# Patient Record
Sex: FEMALE | Race: WHITE | Employment: FULL TIME | URBAN - METROPOLITAN AREA
[De-identification: names, ages, dates, MRNs, and addresses within clinical notes are randomized per-mention and may not be internally consistent; named-entity substitution may affect disease eponyms.]

---

## 2017-12-18 ENCOUNTER — OFFICE VISIT (OUTPATIENT)
Dept: OBGYN CLINIC | Age: 47
End: 2017-12-18
Payer: COMMERCIAL

## 2017-12-18 ENCOUNTER — HOSPITAL ENCOUNTER (OUTPATIENT)
Age: 47
Setting detail: SPECIMEN
Discharge: HOME | End: 2017-12-18
Payer: COMMERCIAL

## 2017-12-18 VITALS
BODY MASS INDEX: 26.15 KG/M2 | HEIGHT: 64 IN | DIASTOLIC BLOOD PRESSURE: 84 MMHG | SYSTOLIC BLOOD PRESSURE: 122 MMHG | WEIGHT: 153.2 LBS

## 2017-12-18 DIAGNOSIS — Z01.419 ENCOUNTER FOR GYNECOLOGICAL EXAMINATION WITHOUT ABNORMAL FINDING: Primary | ICD-10-CM

## 2017-12-18 DIAGNOSIS — Z12.31 ENCOUNTER FOR SCREENING MAMMOGRAM FOR BREAST CANCER: ICD-10-CM

## 2017-12-18 PROCEDURE — 99396 PREV VISIT EST AGE 40-64: CPT | Performed by: NURSE PRACTITIONER

## 2017-12-18 ASSESSMENT — ENCOUNTER SYMPTOMS
DIARRHEA: 0
BACK PAIN: 0
ABDOMINAL PAIN: 0
SHORTNESS OF BREATH: 0
ABDOMINAL DISTENTION: 0
CONSTIPATION: 0
COUGH: 0

## 2017-12-18 NOTE — PROGRESS NOTES
HPI:     Virgen Henry is a 52 y.o. female who presents today for:   Chief Complaint   Patient presents with    Annual Exam     last pap 12/14/16-WNL, last mammogram 12/29/16-WNL       HPI  Here for annual exam.  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 Has 4 children- 19- 13. Eats pretty healthy. Active, but not currently regularly exercising. Sister was dx w/BrCa at age 54. GYNECOLOGIC HISTORY:  Menstrual History: Patient's last menstrual period was 12/04/2017. Sexually Transmitted Infections: No  History of Ectopic Pregnancy:No  Menarche: 12  Cycles 28-30  Duration of cycles: 7  Dysmenorrhea: none  Sexually active: yes  Dyspareunia: none    No current outpatient prescriptions on file. No current facility-administered medications for this visit. No Known Allergies    Past Medical History:   Diagnosis Date    HSV (herpes simplex virus) anogenital infection      Denies family history of breast, ovarian, uterus, colon CA     Past Surgical History:   Procedure Laterality Date    WISDOM TOOTH EXTRACTION       Family History   Problem Relation Age of Onset    Hypertension Father 80    Cancer Father      skin    Cancer Mother      skin    Breast Cancer Sister      Social History   Substance Use Topics    Smoking status: Never Smoker    Smokeless tobacco: Never Used    Alcohol use Yes        Subjective:      Review of Systems   Constitutional: Negative for appetite change and fatigue. HENT: Negative for congestion and hearing loss. Eyes: Negative for visual disturbance. Respiratory: Negative for cough and shortness of breath. Cardiovascular: Negative for chest pain and palpitations. Gastrointestinal: Negative for abdominal distention, abdominal pain, constipation and diarrhea. Genitourinary: Negative for flank pain, frequency, menstrual problem, pelvic pain and vaginal discharge. Musculoskeletal: Negative for back pain. Neurological: Negative for syncope and headaches. Psychiatric/Behavioral: Negative for behavioral problems. Objective:     /84 (Site: Right Arm, Position: Sitting, Cuff Size: Medium Adult)   Ht 5' 4\" (1.626 m)   Wt 153 lb 3.2 oz (69.5 kg)   LMP 12/04/2017   Breastfeeding? No   BMI 26.30 kg/m²   Physical Exam   Constitutional: She is oriented to person, place, and time. She appears well-developed and well-nourished. Eyes: Pupils are equal, round, and reactive to light. Neck: No tracheal deviation present. No thyromegaly present. Cardiovascular: Normal rate, regular rhythm and normal heart sounds. Pulmonary/Chest: Effort normal and breath sounds normal. No respiratory distress. Right breast exhibits no inverted nipple. Left breast exhibits no inverted nipple, no mass, no nipple discharge, no skin change and no tenderness. Breasts are symmetrical.   Abdominal: Soft. Bowel sounds are normal. She exhibits no distension and no mass. There is no tenderness. There is no CVA tenderness. Hernia confirmed negative in the right inguinal area and confirmed negative in the left inguinal area. Genitourinary: No breast swelling, tenderness, discharge or bleeding. There is no rash or lesion on the right labia. There is no rash or lesion on the left labia. Uterus is not deviated, not enlarged and not fixed. Cervix exhibits no motion tenderness, no discharge and no friability. Right adnexum displays no mass, no tenderness and no fullness. Left adnexum displays no mass, no tenderness and no fullness. No tenderness in the vagina. No vaginal discharge found. Musculoskeletal: She exhibits no edema or tenderness. Lymphadenopathy:     She has no cervical adenopathy. Right: No inguinal adenopathy present. Left: No inguinal adenopathy present. Neurological: She is alert and oriented to person, place, and time. Skin: Skin is warm and dry. Psychiatric: She has a normal mood and affect.  Her behavior is normal. Judgment and thought content

## 2017-12-28 LAB — CYTOLOGY REPORT: NORMAL

## 2018-02-02 ENCOUNTER — HOSPITAL ENCOUNTER (OUTPATIENT)
Dept: MAMMOGRAPHY | Age: 48
Discharge: HOME | End: 2018-02-04
Payer: COMMERCIAL

## 2018-02-02 DIAGNOSIS — Z12.31 ENCOUNTER FOR SCREENING MAMMOGRAM FOR BREAST CANCER: ICD-10-CM

## 2018-02-02 PROCEDURE — 77067 SCR MAMMO BI INCL CAD: CPT

## 2018-05-11 ENCOUNTER — EMPLOYEE WELLNESS (OUTPATIENT)
Dept: OTHER | Age: 48
End: 2018-05-11

## 2018-05-11 LAB
CHOLESTEROL/HDL RATIO: 2.9
CHOLESTEROL: 175 MG/DL
GLUCOSE BLD-MCNC: 87 MG/DL (ref 70–99)
HDLC SERPL-MCNC: 61 MG/DL
LDL CHOLESTEROL: 105 MG/DL (ref 0–130)
PATIENT FASTING?: NORMAL
TRIGL SERPL-MCNC: 45 MG/DL
VLDLC SERPL CALC-MCNC: NORMAL MG/DL (ref 1–30)

## 2018-05-21 VITALS — WEIGHT: 149 LBS | BODY MASS INDEX: 25.58 KG/M2

## 2019-02-07 ENCOUNTER — HOSPITAL ENCOUNTER (OUTPATIENT)
Age: 49
Setting detail: SPECIMEN
Discharge: HOME | End: 2019-02-07
Payer: COMMERCIAL

## 2019-02-07 ENCOUNTER — OFFICE VISIT (OUTPATIENT)
Dept: OBGYN CLINIC | Age: 49
End: 2019-02-07
Payer: COMMERCIAL

## 2019-02-07 VITALS
BODY MASS INDEX: 24.52 KG/M2 | DIASTOLIC BLOOD PRESSURE: 84 MMHG | HEIGHT: 64 IN | SYSTOLIC BLOOD PRESSURE: 122 MMHG | WEIGHT: 143.6 LBS

## 2019-02-07 DIAGNOSIS — Z01.419 ENCOUNTER FOR GYNECOLOGICAL EXAMINATION: Primary | ICD-10-CM

## 2019-02-07 DIAGNOSIS — Z12.31 ENCOUNTER FOR SCREENING MAMMOGRAM FOR BREAST CANCER: ICD-10-CM

## 2019-02-07 PROCEDURE — 99396 PREV VISIT EST AGE 40-64: CPT | Performed by: NURSE PRACTITIONER

## 2019-02-07 ASSESSMENT — ENCOUNTER SYMPTOMS
COUGH: 0
BACK PAIN: 0
SHORTNESS OF BREATH: 0
ABDOMINAL PAIN: 0
DIARRHEA: 0
ABDOMINAL DISTENTION: 0
CONSTIPATION: 0

## 2019-02-11 ENCOUNTER — OFFICE VISIT (OUTPATIENT)
Dept: FAMILY MEDICINE CLINIC | Age: 49
End: 2019-02-11
Payer: COMMERCIAL

## 2019-02-11 VITALS
TEMPERATURE: 98.4 F | OXYGEN SATURATION: 98 % | WEIGHT: 144 LBS | HEIGHT: 64 IN | SYSTOLIC BLOOD PRESSURE: 112 MMHG | BODY MASS INDEX: 24.59 KG/M2 | HEART RATE: 83 BPM | DIASTOLIC BLOOD PRESSURE: 68 MMHG

## 2019-02-11 DIAGNOSIS — Z00.00 ROUTINE GENERAL MEDICAL EXAMINATION AT A HEALTH CARE FACILITY: Primary | ICD-10-CM

## 2019-02-11 DIAGNOSIS — Z76.89 ESTABLISHING CARE WITH NEW DOCTOR, ENCOUNTER FOR: ICD-10-CM

## 2019-02-11 DIAGNOSIS — Z23 NEED FOR DIPHTHERIA-TETANUS-PERTUSSIS (TDAP) VACCINE: ICD-10-CM

## 2019-02-11 PROCEDURE — 90471 IMMUNIZATION ADMIN: CPT | Performed by: NURSE PRACTITIONER

## 2019-02-11 PROCEDURE — 99386 PREV VISIT NEW AGE 40-64: CPT | Performed by: NURSE PRACTITIONER

## 2019-02-11 PROCEDURE — 90715 TDAP VACCINE 7 YRS/> IM: CPT | Performed by: NURSE PRACTITIONER

## 2019-02-11 ASSESSMENT — PATIENT HEALTH QUESTIONNAIRE - PHQ9
SUM OF ALL RESPONSES TO PHQ QUESTIONS 1-9: 0
2. FEELING DOWN, DEPRESSED OR HOPELESS: 0
SUM OF ALL RESPONSES TO PHQ9 QUESTIONS 1 & 2: 0
1. LITTLE INTEREST OR PLEASURE IN DOING THINGS: 0
SUM OF ALL RESPONSES TO PHQ QUESTIONS 1-9: 0

## 2019-02-11 ASSESSMENT — ENCOUNTER SYMPTOMS
COLOR CHANGE: 0
SORE THROAT: 0
SINUS PRESSURE: 0
DIARRHEA: 0
NAUSEA: 0
ABDOMINAL PAIN: 0
CHEST TIGHTNESS: 0
COUGH: 0
CONSTIPATION: 0
SHORTNESS OF BREATH: 0

## 2019-02-19 ENCOUNTER — HOSPITAL ENCOUNTER (OUTPATIENT)
Dept: MAMMOGRAPHY | Age: 49
Discharge: HOME | End: 2019-02-21
Payer: COMMERCIAL

## 2019-02-19 DIAGNOSIS — Z12.31 ENCOUNTER FOR SCREENING MAMMOGRAM FOR BREAST CANCER: ICD-10-CM

## 2019-02-19 PROCEDURE — 77063 BREAST TOMOSYNTHESIS BI: CPT

## 2019-02-21 LAB — CYTOLOGY REPORT: NORMAL

## 2020-09-09 ENCOUNTER — HOSPITAL ENCOUNTER (OUTPATIENT)
Dept: MAMMOGRAPHY | Age: 50
Discharge: HOME | End: 2020-09-11
Payer: COMMERCIAL

## 2020-09-09 PROCEDURE — 77063 BREAST TOMOSYNTHESIS BI: CPT

## 2020-09-10 LAB
CHOLESTEROL/HDL RATIO: 3.5
CHOLESTEROL: 216 MG/DL
GLUCOSE BLD-MCNC: 89 MG/DL (ref 70–99)
HDLC SERPL-MCNC: 62 MG/DL
LDL CHOLESTEROL: 143 MG/DL (ref 0–130)
PATIENT FASTING?: YES
TRIGL SERPL-MCNC: 56 MG/DL
VLDLC SERPL CALC-MCNC: ABNORMAL MG/DL (ref 1–30)

## 2020-09-11 ENCOUNTER — EMPLOYEE WELLNESS (OUTPATIENT)
Dept: OTHER | Age: 50
End: 2020-09-11

## 2020-10-19 VITALS — BODY MASS INDEX: 25.58 KG/M2 | WEIGHT: 149 LBS

## 2021-03-31 ENCOUNTER — OFFICE VISIT (OUTPATIENT)
Dept: FAMILY MEDICINE CLINIC | Age: 51
End: 2021-03-31
Payer: COMMERCIAL

## 2021-03-31 ENCOUNTER — HOSPITAL ENCOUNTER (OUTPATIENT)
Age: 51
Setting detail: SPECIMEN
Discharge: HOME | End: 2021-03-31
Payer: COMMERCIAL

## 2021-03-31 VITALS
HEIGHT: 64 IN | SYSTOLIC BLOOD PRESSURE: 112 MMHG | TEMPERATURE: 97.9 F | OXYGEN SATURATION: 94 % | BODY MASS INDEX: 25.99 KG/M2 | HEART RATE: 79 BPM | DIASTOLIC BLOOD PRESSURE: 72 MMHG | WEIGHT: 152.2 LBS

## 2021-03-31 DIAGNOSIS — Z12.31 ENCOUNTER FOR SCREENING MAMMOGRAM FOR MALIGNANT NEOPLASM OF BREAST: ICD-10-CM

## 2021-03-31 DIAGNOSIS — Z12.4 CERVICAL CANCER SCREENING: Primary | ICD-10-CM

## 2021-03-31 DIAGNOSIS — Z12.11 COLON CANCER SCREENING: ICD-10-CM

## 2021-03-31 DIAGNOSIS — Z12.31 ENCOUNTER FOR SCREENING MAMMOGRAM FOR MALIGNANT NEOPLASM OF BREAST: Primary | ICD-10-CM

## 2021-03-31 PROCEDURE — 99396 PREV VISIT EST AGE 40-64: CPT | Performed by: NURSE PRACTITIONER

## 2021-03-31 ASSESSMENT — PATIENT HEALTH QUESTIONNAIRE - PHQ9
2. FEELING DOWN, DEPRESSED OR HOPELESS: 0
SUM OF ALL RESPONSES TO PHQ QUESTIONS 1-9: 0

## 2021-03-31 NOTE — PROGRESS NOTES
Visit Information    Have you changed or started any medications since your last visit including any over-the-counter medicines, vitamins, or herbal medicines? no   Have you stopped taking any of your medications? Is so, why? -  no  Are you having any side effects from any of your medications? - no    Have you seen any other physician or provider since your last visit?  no   Have you had any other diagnostic tests since your last visit?  no   Have you been seen in the emergency room and/or had an admission in a hospital since we last saw you?  no   Have you had your routine dental cleaning in the past 6 months?  no     Do you have an active MyChart account? If no, what is the barrier?   Yes    Patient Care Team:  RUSSEL Redd CNP as PCP - General (Family Nurse Practitioner)  RUSSEL Redd CNP as PCP - [facility] Provider    Medical History Review  Past Medical, Family, and Social History reviewed and  contribute to the patient presenting condition    Health Maintenance   Topic Date Due    Hepatitis C screen  Never done    HIV screen  Never done    COVID-19 Vaccine (1) Never done    Shingles Vaccine (1 of 2) Never done    Colon cancer screen colonoscopy  Never done    Breast cancer screen  09/09/2021    Cervical cancer screen  02/07/2024    Lipid screen  09/10/2025    DTaP/Tdap/Td vaccine (2 - Td) 02/11/2029    Flu vaccine  Completed    Hepatitis A vaccine  Aged Out    Hepatitis B vaccine  Aged Out    Hib vaccine  Aged Out    Meningococcal (ACWY) vaccine  Aged Out    Pneumococcal 0-64 years Vaccine  Aged Out

## 2021-03-31 NOTE — PROGRESS NOTES
Out       Subjective:      normal menses, no abnormal bleeding, pelvic pain or discharge, no breast pain or new or enlarging lumps on self exam, no vaginal bleeding, no discharge or pelvic pain. No new partners. No present concerns. Objective:   /72 (Site: Right Upper Arm, Position: Sitting, Cuff Size: Medium Adult)   Pulse 79   Temp 97.9 °F (36.6 °C) (Temporal)   Ht 5' 4\" (1.626 m)   Wt 152 lb 3.2 oz (69 kg)   LMP 03/14/2021 (Exact Date)   SpO2 94%   BMI 26.13 kg/m²     General Appearance: alert and oriented to person, place and time, well developed and well- nourished, in no acute distress  Skin: warm and dry, no rash or erythema  Head: normocephalic and atraumatic  Neck: supple and non-tender without mass, no thyromegaly or thyroid nodules, no cervical lymphadenopathy  Pulmonary/Chest: clear to auscultation bilaterally- no wheezes, rales or rhonchi, normal air movement, no respiratory distress  Cardiovascular: normal rate, regular rhythm, normal S1 and S2, no murmurs, rubs, clicks, or gallops  Abdomen: soft, non-tender, non-distended, normal bowel sounds, no masses or organomegaly  Pelvic: normal external genitalia, vulva, vagina, cervix, uterus and adnexa. No CMT. No noted discharge or lesions. No masses noted. Breast: appear normal, no suspicious masses, no skin or nipple changes or axillary nodes bilaterally. Psych: pleasant, cooperative          Assessment:    annual pap exam   Diagnosis Orders   1. Cervical cancer screening  PAP Smear   2. Colon cancer screening  Cologuard (For External Results Only)   3. Encounter for screening mammogram for malignant neoplasm of breast  MRI BREAST BILATERAL WO CONTRAST     Wt Readings from Last 3 Encounters:   03/31/21 152 lb 3.2 oz (69 kg)   09/11/20 149 lb (67.6 kg)   02/11/19 144 lb (65.3 kg)     BP Readings from Last 3 Encounters:   03/31/21 112/72   02/11/19 112/68   02/07/19 122/84       Plan:      Return in about 1 year (around 3/31/2022). Orders Placed This Encounter   Procedures    Cologuard (For External Results Only)     This test is performed by an external laboratory and is used for result attachment only. It is required that this order requisition be faxed to: [facility] @ [phone]. See [email or web address] for further information. Standing Status:   Future     Standing Expiration Date:   3/31/2022    MRI BREAST BILATERAL WO CONTRAST     Standing Status:   Future     Standing Expiration Date:   3/31/2022     Order Specific Question:   Reason for exam:     Answer:   eval for changes    PAP Smear     Patient History:    No LMP recorded. OBGYN Status: Having periods  Past Surgical History:  No date: WISDOM TOOTH EXTRACTION      Social History    Tobacco Use      Smoking status: Never Smoker      Smokeless tobacco: Never Used       Standing Status:   Future     Standing Expiration Date:   3/31/2022     Order Specific Question:   Collection Type     Answer: Thin Prep     Order Specific Question:   Prior Abnormal Pap Test     Answer:   No     Order Specific Question:   Screening or Diagnostic     Answer:   Screening     Order Specific Question:   HPV Requested? Answer:   Yes -  If ASCUS Reflex HPV     Order Specific Question:   High Risk Patient     Answer:   N/A     Labs utd  MRI breast ordered, she would like to hold on high risk breast clinic for now  Patient advised to follow heart healthy, low fat  diet and 150 minutes of cardiovascular exercise per week   The nature of sun-induced photo-aging and skin cancers is discussed. Sun avoidance, protective clothing, and the use of 30-SPF sunscreens is advised. Observe closely for skin damage/changes, and call if such occurs. Will call with test results  Send back cologuard kit asap      Patient given educational materials - see patient instructions. Discussed use, benefit, and side effects of prescribed medications. All patient questions answered.   Pt voiced understanding. Reviewed health maintenance. Instructed to continue current medications, diet and exercise. Patient agreed with treatment plan. Follow up as directed below.      Electronically signed by SANA Márquez on 3/31/2021 at 11:33 AM

## 2021-04-06 LAB — CYTOLOGY REPORT: NORMAL

## 2021-04-15 DIAGNOSIS — Z12.11 COLON CANCER SCREENING: ICD-10-CM

## 2021-06-08 ENCOUNTER — HOSPITAL ENCOUNTER (OUTPATIENT)
Dept: MRI IMAGING | Age: 51
Discharge: HOME | End: 2021-06-10
Payer: COMMERCIAL

## 2021-06-08 DIAGNOSIS — Z12.31 ENCOUNTER FOR SCREENING MAMMOGRAM FOR MALIGNANT NEOPLASM OF BREAST: ICD-10-CM

## 2021-06-08 PROCEDURE — 6360000004 HC RX CONTRAST MEDICATION: Performed by: NURSE PRACTITIONER

## 2021-06-08 PROCEDURE — 2580000003 HC RX 258: Performed by: NURSE PRACTITIONER

## 2021-06-08 PROCEDURE — A9579 GAD-BASE MR CONTRAST NOS,1ML: HCPCS | Performed by: NURSE PRACTITIONER

## 2021-06-08 PROCEDURE — 77049 MRI BREAST C-+ W/CAD BI: CPT

## 2021-06-08 RX ORDER — SODIUM CHLORIDE 0.9 % (FLUSH) 0.9 %
10 SYRINGE (ML) INJECTION PRN
Status: DISCONTINUED | OUTPATIENT
Start: 2021-06-08 | End: 2021-06-11 | Stop reason: HOSPADM

## 2021-06-08 RX ORDER — 0.9 % SODIUM CHLORIDE 0.9 %
50 INTRAVENOUS SOLUTION INTRAVENOUS ONCE
Status: COMPLETED | OUTPATIENT
Start: 2021-06-08 | End: 2021-06-08

## 2021-06-08 RX ADMIN — SODIUM CHLORIDE, PRESERVATIVE FREE 10 ML: 5 INJECTION INTRAVENOUS at 11:18

## 2021-06-08 RX ADMIN — GADOTERIDOL 14 ML: 279.3 INJECTION, SOLUTION INTRAVENOUS at 11:16

## 2021-06-08 RX ADMIN — SODIUM CHLORIDE 50 ML: 9 INJECTION, SOLUTION INTRAVENOUS at 11:17

## 2021-09-08 LAB
CHOLESTEROL/HDL RATIO: 3.7
CHOLESTEROL: 201 MG/DL
GLUCOSE BLD-MCNC: 89 MG/DL (ref 70–99)
HDLC SERPL-MCNC: 55 MG/DL
LDL CHOLESTEROL: 135 MG/DL (ref 0–130)
PATIENT FASTING?: YES
TRIGL SERPL-MCNC: 55 MG/DL
VLDLC SERPL CALC-MCNC: ABNORMAL MG/DL (ref 1–30)

## 2021-10-26 ENCOUNTER — HOSPITAL ENCOUNTER (OUTPATIENT)
Dept: MAMMOGRAPHY | Age: 51
Discharge: HOME | End: 2021-10-28
Payer: COMMERCIAL

## 2021-10-26 DIAGNOSIS — Z12.31 VISIT FOR SCREENING MAMMOGRAM: ICD-10-CM

## 2021-10-26 PROCEDURE — 77063 BREAST TOMOSYNTHESIS BI: CPT

## 2021-10-27 DIAGNOSIS — R92.8 ABNORMAL MAMMOGRAM OF RIGHT BREAST: Primary | ICD-10-CM

## 2021-11-15 ENCOUNTER — HOSPITAL ENCOUNTER (OUTPATIENT)
Dept: ULTRASOUND IMAGING | Age: 51
Discharge: HOME | End: 2021-11-17
Payer: COMMERCIAL

## 2021-11-15 ENCOUNTER — HOSPITAL ENCOUNTER (OUTPATIENT)
Dept: MAMMOGRAPHY | Age: 51
Discharge: HOME | End: 2021-11-17
Payer: COMMERCIAL

## 2021-11-15 DIAGNOSIS — Z12.39 BREAST CANCER SCREENING, HIGH RISK PATIENT: Primary | ICD-10-CM

## 2021-11-15 DIAGNOSIS — R92.8 ABNORMAL MAMMOGRAM OF RIGHT BREAST: ICD-10-CM

## 2021-11-15 PROCEDURE — G0279 TOMOSYNTHESIS, MAMMO: HCPCS

## 2022-12-22 ENCOUNTER — HOSPITAL ENCOUNTER (OUTPATIENT)
Dept: MAMMOGRAPHY | Age: 52
Discharge: HOME | End: 2022-12-24
Payer: COMMERCIAL

## 2022-12-22 DIAGNOSIS — Z12.31 ENCOUNTER FOR SCREENING MAMMOGRAM FOR BREAST CANCER: ICD-10-CM

## 2022-12-22 PROCEDURE — 77067 SCR MAMMO BI INCL CAD: CPT

## 2023-09-19 LAB
CHOLEST SERPL-MCNC: 183 MG/DL
CHOLESTEROL/HDL RATIO: 3.6
GLUCOSE SERPL-MCNC: 94 MG/DL (ref 70–99)
HDLC SERPL-MCNC: 51 MG/DL
LDLC SERPL CALC-MCNC: 121 MG/DL (ref 0–130)
PATIENT FASTING?: YES
TRIGL SERPL-MCNC: 56 MG/DL

## 2023-12-06 ENCOUNTER — TELEPHONE (OUTPATIENT)
Dept: FAMILY MEDICINE CLINIC | Age: 53
End: 2023-12-06

## 2023-12-06 NOTE — TELEPHONE ENCOUNTER
----- Message from Hanna Solisdeandre sent at 12/6/2023 12:25 PM EST -----  Subject: Message to Provider    QUESTIONS  Information for Provider? patient had to cancel annual physical with   possible Pap Smear on 1/30/24. She needs to reschedule this appt. I wasn't   able to find anything else   ---------------------------------------------------------------------------  --------------  Taz Owen INFO  [phone]; OK to leave message on voicemail  ---------------------------------------------------------------------------  --------------  SCRIPT ANSWERS  Relationship to Patient?  Self

## 2024-01-11 ENCOUNTER — HOSPITAL ENCOUNTER (OUTPATIENT)
Dept: MAMMOGRAPHY | Age: 54
Discharge: HOME | End: 2024-01-13
Payer: COMMERCIAL

## 2024-01-11 VITALS — WEIGHT: 152 LBS | HEIGHT: 64 IN | BODY MASS INDEX: 25.95 KG/M2

## 2024-01-11 DIAGNOSIS — R92.8 ABNORMAL MAMMOGRAM OF LEFT BREAST: Primary | ICD-10-CM

## 2024-01-11 DIAGNOSIS — Z12.31 ENCOUNTER FOR SCREENING MAMMOGRAM FOR MALIGNANT NEOPLASM OF BREAST: ICD-10-CM

## 2024-01-11 PROCEDURE — 77063 BREAST TOMOSYNTHESIS BI: CPT

## 2024-01-15 ENCOUNTER — HOSPITAL ENCOUNTER (OUTPATIENT)
Dept: MAMMOGRAPHY | Age: 54
Discharge: HOME | End: 2024-01-17
Payer: COMMERCIAL

## 2024-01-15 DIAGNOSIS — R92.8 ABNORMAL MAMMOGRAM OF LEFT BREAST: ICD-10-CM

## 2024-01-15 PROCEDURE — 77065 DX MAMMO INCL CAD UNI: CPT

## 2024-02-08 ENCOUNTER — OFFICE VISIT (OUTPATIENT)
Dept: FAMILY MEDICINE CLINIC | Age: 54
End: 2024-02-08
Payer: COMMERCIAL

## 2024-02-08 ENCOUNTER — HOSPITAL ENCOUNTER (OUTPATIENT)
Age: 54
Setting detail: SPECIMEN
Discharge: HOME | End: 2024-02-08

## 2024-02-08 VITALS
BODY MASS INDEX: 25.06 KG/M2 | HEIGHT: 64 IN | DIASTOLIC BLOOD PRESSURE: 70 MMHG | TEMPERATURE: 98.9 F | SYSTOLIC BLOOD PRESSURE: 110 MMHG | HEART RATE: 90 BPM | WEIGHT: 146.8 LBS | OXYGEN SATURATION: 98 %

## 2024-02-08 DIAGNOSIS — Z12.4 CERVICAL CANCER SCREENING: Primary | ICD-10-CM

## 2024-02-08 PROCEDURE — 99396 PREV VISIT EST AGE 40-64: CPT | Performed by: NURSE PRACTITIONER

## 2024-02-08 SDOH — ECONOMIC STABILITY: HOUSING INSECURITY
IN THE LAST 12 MONTHS, WAS THERE A TIME WHEN YOU DID NOT HAVE A STEADY PLACE TO SLEEP OR SLEPT IN A SHELTER (INCLUDING NOW)?: NO

## 2024-02-08 SDOH — ECONOMIC STABILITY: FOOD INSECURITY: WITHIN THE PAST 12 MONTHS, YOU WORRIED THAT YOUR FOOD WOULD RUN OUT BEFORE YOU GOT MONEY TO BUY MORE.: NEVER TRUE

## 2024-02-08 SDOH — ECONOMIC STABILITY: INCOME INSECURITY: HOW HARD IS IT FOR YOU TO PAY FOR THE VERY BASICS LIKE FOOD, HOUSING, MEDICAL CARE, AND HEATING?: NOT VERY HARD

## 2024-02-08 SDOH — ECONOMIC STABILITY: FOOD INSECURITY: WITHIN THE PAST 12 MONTHS, THE FOOD YOU BOUGHT JUST DIDN'T LAST AND YOU DIDN'T HAVE MONEY TO GET MORE.: NEVER TRUE

## 2024-02-08 ASSESSMENT — PATIENT HEALTH QUESTIONNAIRE - PHQ9
1. LITTLE INTEREST OR PLEASURE IN DOING THINGS: 0
SUM OF ALL RESPONSES TO PHQ QUESTIONS 1-9: 0
2. FEELING DOWN, DEPRESSED OR HOPELESS: 0
SUM OF ALL RESPONSES TO PHQ QUESTIONS 1-9: 0
SUM OF ALL RESPONSES TO PHQ9 QUESTIONS 1 & 2: 0

## 2024-02-08 NOTE — PROGRESS NOTES
[facility]  [address] [place] [address]  [phone]      Fiorella Cummings is a 53 y.o. female who presents today for her  medical conditions/complaints as noted below.  Fiorella Cummings is c/o of Gynecologic Exam  .    HPI:     HPI    GYN History:  Menstrual Hx: regular       DOLP:1/8/24  ST1 Hx:herpes genital-no recent flare ups  Ectopic pregnancy Hx:none  Menarche: 12  Cycle every 30 days  Duration of Cycle: 5-7 days  Dysmenorrhea:   heavy x 3 days, then mild   Sexually Active:  yes  Dyspareunia: no  Mamm done and normal 1/2024    Past Medical History:   Diagnosis Date    HSV (herpes simplex virus) anogenital infection       Past Surgical History:   Procedure Laterality Date    WISDOM TOOTH EXTRACTION       Family History   Problem Relation Age of Onset    Cancer Mother         skin    Hypertension Mother         alzheimers    Hypertension Father 88    Cancer Father         skin    High Blood Pressure Father     Other Father         leg amputation vascular    Atrial Fibrillation Father     Breast Cancer Sister 50    Atrial Fibrillation Brother     Atrial Fibrillation Sister      Social History     Tobacco Use    Smoking status: Never    Smokeless tobacco: Never   Substance Use Topics    Alcohol use: Yes     Alcohol/week: 2.0 standard drinks of alcohol     Types: 2 Cans of beer per week      No current outpatient medications on file.     No current facility-administered medications for this visit.     No Known Allergies    Health Maintenance   Topic Date Due    Hepatitis B vaccine (1 of 3 - 3-dose series) Never done    Depression Screen  Never done    HIV screen  Never done    Hepatitis C screen  Never done    Shingles vaccine (1 of 2) Never done    COVID-19 Vaccine (4 - 2023-24 season) 09/01/2023    Cervical cancer screen  03/31/2024    Colorectal Cancer Screen  04/12/2024    Breast cancer screen  01/15/2025    Lipids  09/19/2028    DTaP/Tdap/Td vaccine (2 - Td or Tdap)

## 2024-02-08 NOTE — PROGRESS NOTES
Visit Information    Have you changed or started any medications since your last visit including any over-the-counter medicines, vitamins, or herbal medicines? no   Have you stopped taking any of your medications? Is so, why? -  no  Are you having any side effects from any of your medications? - no    Have you seen any other physician or provider since your last visit?  no   Have you had any other diagnostic tests since your last visit?  no   Have you been seen in the emergency room and/or had an admission in a hospital since we last saw you?  no   Have you had your routine dental cleaning in the past 6 months?  no     Do you have an active MyChart account? If no, what is the barrier?  Yes    Patient Care Team:  Elidia Lewis APRN - CNP as PCP - General (Family Nurse Practitioner)  Elidia Lewis APRN - CNP as PCP - Empaneled Provider    Medical History Review  Past Medical, Family, and Social History reviewed and  contribute to the patient presenting condition    Health Maintenance   Topic Date Due    Hepatitis B vaccine (1 of 3 - 3-dose series) Never done    Depression Screen  Never done    HIV screen  Never done    Hepatitis C screen  Never done    Shingles vaccine (1 of 2) Never done    Flu vaccine (1) 08/01/2023    COVID-19 Vaccine (3 - 2023-24 season) 09/01/2023    Cervical cancer screen  03/31/2024    Colorectal Cancer Screen  04/12/2024    Breast cancer screen  01/15/2025    Lipids  09/19/2028    DTaP/Tdap/Td vaccine (2 - Td or Tdap) 02/11/2029    Hepatitis A vaccine  Aged Out    Hib vaccine  Aged Out    Polio vaccine  Aged Out    Meningococcal (ACWY) vaccine  Aged Out    Pneumococcal 0-64 years Vaccine  Aged Out

## 2024-03-12 LAB — CYTOLOGY REPORT: NORMAL

## 2025-03-05 ENCOUNTER — HOSPITAL ENCOUNTER (OUTPATIENT)
Dept: MAMMOGRAPHY | Age: 55
Discharge: HOME | End: 2025-03-07
Payer: COMMERCIAL

## 2025-03-05 DIAGNOSIS — Z12.31 ENCOUNTER FOR SCREENING MAMMOGRAM FOR BREAST CANCER: ICD-10-CM

## 2025-03-05 PROCEDURE — 77063 BREAST TOMOSYNTHESIS BI: CPT

## 2025-03-07 DIAGNOSIS — Z91.89 AT HIGH RISK FOR BREAST CANCER: Primary | ICD-10-CM

## 2025-06-26 ENCOUNTER — HOSPITAL ENCOUNTER (EMERGENCY)
Age: 55
Discharge: HOME | End: 2025-06-26
Attending: EMERGENCY MEDICINE
Payer: OTHER MISCELLANEOUS

## 2025-06-26 ENCOUNTER — APPOINTMENT (OUTPATIENT)
Dept: GENERAL RADIOLOGY | Age: 55
End: 2025-06-26
Payer: OTHER MISCELLANEOUS

## 2025-06-26 VITALS
OXYGEN SATURATION: 100 % | SYSTOLIC BLOOD PRESSURE: 143 MMHG | RESPIRATION RATE: 17 BRPM | TEMPERATURE: 97.7 F | HEART RATE: 109 BPM | DIASTOLIC BLOOD PRESSURE: 91 MMHG
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
## 2025-06-26 DIAGNOSIS — M79.672 LEFT FOOT PAIN: Primary | ICD-10-CM

## 2025-06-26 PROCEDURE — 73630 X-RAY EXAM OF FOOT: CPT

## 2025-06-26 PROCEDURE — 99283 EMERGENCY DEPT VISIT LOW MDM: CPT | Performed by: EMERGENCY MEDICINE

## 2025-06-26 PROCEDURE — 6370000000 HC RX 637 (ALT 250 FOR IP)

## 2025-06-26 RX ORDER — ACETAMINOPHEN 500 MG
1000 TABLET ORAL ONCE
Status: COMPLETED | OUTPATIENT
Start: 2025-06-26 | End: 2025-06-26

## 2025-06-26 RX ADMIN — ACETAMINOPHEN 1000 MG: 500 TABLET ORAL at 22:18

## 2025-06-26 ASSESSMENT — PAIN SCALES - GENERAL: PAINLEVEL_OUTOF10: 7
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
## 2025-07-16 LAB
CHOLEST SERPL-MCNC: 221 MG/DL (ref 0–199)
CHOLESTEROL/HDL RATIO: 4.1
GLUCOSE SERPL-MCNC: 86 MG/DL (ref 74–99)
HDLC SERPL-MCNC: 54 MG/DL
LDLC SERPL CALC-MCNC: 149 MG/DL (ref 0–100)
PATIENT FASTING?: YES
TRIGL SERPL-MCNC: 91 MG/DL
VLDLC SERPL CALC-MCNC: 18 MG/DL (ref 1–30)

## 2025-09-05 ENCOUNTER — OFFICE VISIT (OUTPATIENT)
Dept: PRIMARY CARE CLINIC | Age: 55
End: 2025-09-05

## 2025-09-05 VITALS
DIASTOLIC BLOOD PRESSURE: 74 MMHG | OXYGEN SATURATION: 98 % | HEART RATE: 75 BPM | TEMPERATURE: 97.9 F | SYSTOLIC BLOOD PRESSURE: 134 MMHG

## 2025-09-05 DIAGNOSIS — J35.8 MUCOUS CYST OF TONSIL: Primary | ICD-10-CM

## 2025-09-05 ASSESSMENT — ENCOUNTER SYMPTOMS
SINUS PRESSURE: 0
EYES NEGATIVE: 1
SORE THROAT: 0
GASTROINTESTINAL NEGATIVE: 1
SINUS PAIN: 0
RESPIRATORY NEGATIVE: 1